# Patient Record
Sex: MALE | Race: WHITE | Employment: FULL TIME | URBAN - METROPOLITAN AREA
[De-identification: names, ages, dates, MRNs, and addresses within clinical notes are randomized per-mention and may not be internally consistent; named-entity substitution may affect disease eponyms.]

---

## 2020-10-26 ENCOUNTER — HOSPITAL ENCOUNTER (EMERGENCY)
Age: 35
Discharge: HOME | End: 2020-10-26
Attending: EMERGENCY MEDICINE
Payer: COMMERCIAL

## 2020-10-26 ENCOUNTER — HOSPITAL ENCOUNTER (EMERGENCY)
Age: 35
Discharge: HOME | End: 2020-10-26
Payer: COMMERCIAL

## 2020-10-26 ENCOUNTER — APPOINTMENT (OUTPATIENT)
Dept: CT IMAGING | Age: 35
End: 2020-10-26
Payer: COMMERCIAL

## 2020-10-26 VITALS
RESPIRATION RATE: 20 BRPM | HEART RATE: 86 BPM | OXYGEN SATURATION: 96 % | TEMPERATURE: 97.3 F | WEIGHT: 315 LBS | BODY MASS INDEX: 42.66 KG/M2 | HEIGHT: 72 IN | DIASTOLIC BLOOD PRESSURE: 71 MMHG | SYSTOLIC BLOOD PRESSURE: 130 MMHG

## 2020-10-26 VITALS
RESPIRATION RATE: 20 BRPM | OXYGEN SATURATION: 98 % | TEMPERATURE: 97 F | HEART RATE: 70 BPM | DIASTOLIC BLOOD PRESSURE: 80 MMHG | SYSTOLIC BLOOD PRESSURE: 148 MMHG

## 2020-10-26 PROCEDURE — 99212 OFFICE O/P EST SF 10 MIN: CPT

## 2020-10-26 PROCEDURE — 6360000002 HC RX W HCPCS

## 2020-10-26 PROCEDURE — 70450 CT HEAD/BRAIN W/O DYE: CPT

## 2020-10-26 PROCEDURE — 6370000000 HC RX 637 (ALT 250 FOR IP): Performed by: STUDENT IN AN ORGANIZED HEALTH CARE EDUCATION/TRAINING PROGRAM

## 2020-10-26 PROCEDURE — 99284 EMERGENCY DEPT VISIT MOD MDM: CPT

## 2020-10-26 RX ORDER — TETRAHYDROZOLINE HCL 0.05 %
1 DROPS OPHTHALMIC (EYE) ONCE
Status: COMPLETED | OUTPATIENT
Start: 2020-10-26 | End: 2020-10-26

## 2020-10-26 RX ORDER — KETOROLAC TROMETHAMINE 15 MG/ML
15 INJECTION, SOLUTION INTRAMUSCULAR; INTRAVENOUS ONCE
Status: DISCONTINUED | OUTPATIENT
Start: 2020-10-26 | End: 2020-10-26

## 2020-10-26 RX ORDER — KETOROLAC TROMETHAMINE 15 MG/ML
INJECTION, SOLUTION INTRAMUSCULAR; INTRAVENOUS
Status: DISCONTINUED
Start: 2020-10-26 | End: 2020-10-27 | Stop reason: HOSPADM

## 2020-10-26 RX ORDER — IBUPROFEN 400 MG/1
400 TABLET ORAL ONCE
Status: COMPLETED | OUTPATIENT
Start: 2020-10-26 | End: 2020-10-26

## 2020-10-26 RX ORDER — PREDNISONE 20 MG/1
60 TABLET ORAL DAILY
Qty: 15 TABLET | Refills: 0 | Status: SHIPPED | OUTPATIENT
Start: 2020-10-26 | End: 2020-10-31

## 2020-10-26 RX ORDER — VALACYCLOVIR HYDROCHLORIDE 1 G/1
1000 TABLET, FILM COATED ORAL 3 TIMES DAILY
Qty: 21 TABLET | Refills: 0 | Status: SHIPPED | OUTPATIENT
Start: 2020-10-26 | End: 2020-11-02

## 2020-10-26 RX ADMIN — TETRAHYDROZOLINE HCL 1 DROP: 0.05 SOLUTION/ DROPS OPHTHALMIC at 21:52

## 2020-10-26 RX ADMIN — IBUPROFEN 400 MG: 400 TABLET ORAL at 22:11

## 2020-10-26 ASSESSMENT — ENCOUNTER SYMPTOMS
VOMITING: 0
DIARRHEA: 0
COUGH: 0
EYE DISCHARGE: 0
SHORTNESS OF BREATH: 0
ABDOMINAL PAIN: 0
BACK PAIN: 0
WHEEZING: 0
EYE REDNESS: 0
SORE THROAT: 0
SINUS PRESSURE: 0
EYE PAIN: 1
NAUSEA: 0

## 2020-10-26 ASSESSMENT — PAIN SCALES - GENERAL: PAINLEVEL_OUTOF10: 6

## 2020-10-26 NOTE — ED PROVIDER NOTES
Department of Emergency Medicine  [facility]  Provider Note  Admit Date/Time: 10/26/2020  4:05 PM  Room: 02/02  MRN: [id number]  Chief Complaint: Other (started  yesterday right sided facial  droop    states went to dentist  was told it is bels palys   called dr  was told to come here  no weakness anywhere)       History of Present Illness   Source of history provided by:  patient. History/Exam Limitations: none. Sonia Monroy is a 38 y.o. male who has a past medical history of:   Past Medical History:   Diagnosis Date    Bursitis of shoulder     presents to the urgent care center by private car for evaluation. He developed a right-sided facial droop starting yesterday. He said it started with his mouth and he felt that that he could have had a dental problem so he went to the dentist this morning and his dental checkup was fine. He states that now the entire right side of his face is numb and he cannot close the right eye he cannot raise the right eyebrow he has a right-sided facial droop he has a sharp pain behind his right ear, and  this morning he said he had an occipital headache but that went away. He said he has some intermittent numbness in the right thigh but that has been going on for 2 weeks and may not be related. HE used the [facility] system and they told him it was Bell's palsy and that he should come to urgent care for evaluation. ROS    Pertinent positives and negatives are stated within HPI, all other systems reviewed and are negative. History reviewed. No pertinent surgical history. Social History:  reports that he has quit smoking. He has never used smokeless tobacco. He reports current alcohol use. He reports that he does not use drugs. Family History: family history includes Cancer in his father; Diabetes in his mother. Allergies: Cinnamon;  Other; and Pcn [penicillins]    Physical Exam   Oxygen Saturation Interpretation: Normal.   ED Triage Vitals [10/26/20 1607]   BP Temp Temp Source Pulse Resp SpO2 Height Weight   130/71 97.3 °F (36.3 °C) Infrared 86 20 96 % 6' (1.829 m) (!) 340 lb (154.2 kg)       Physical Exam  · Constitutional/General: Alert and oriented x3, well appearing, non toxic in NAD  · HEENT:  NC/NT. Unable to raise the right eyebrow, unable to close the right eye, he has a right-sided facial droop. · Neck: Supple, full ROM,  · Respiratory: Lungs clear to auscultation bilaterally, no wheezes, rales, or rhonchi. Not in respiratory distress  · CV:  Regular rate. Regular rhythm. No murmurs, gallops, or rubs. 2+ distal pulses  · Musculoskeletal: Moves all extremities x 4. Warm and well perfused, no clubbing, cyanosis, or edema. Capillary refill <3 seconds  · Integument: skin warm and dry. No rashes. · Lymphatic: no lymphadenopathy noted  · Neurologic: He has 5+ strength in the bilateral upper and lower extremities he has no drifting of the extremities, speech is normal.  ·   · Psychiatric: Normal Affect    Lab / Imaging Results   (All laboratory and radiology results have been personally reviewed by myself)  Labs:  No results found for this visit on 10/26/20. Imaging: All Radiology results interpreted by Radiologist unless otherwise noted. No orders to display       ED Course / Medical Decision Making   Medications - No data to display         MDM:   Patient with right-sided facial droop unable to close the right eye unable to raise her right eyebrow most likely Bell's palsy but he also has a headache this morning and the pain behind his right ear no other deficits noted. I did advise him to be evaluated in the ED for evaluation. Assessment      1. Facial droop      Plan   Discharge to home and advised to contact No follow-up provider specified.  Patient condition is good    New Medications     New Prescriptions    No medications on file     Electronically signed by PREETI Moon CNP   DD: 10/26/20  **This report was transcribed using voice recognition software. Every effort was made to ensure accuracy; however, inadvertent computerized transcription errors may be present.   END OF ED PROVIDER NOTE     Brook Min, APRN - CNP  10/26/20 8000

## 2020-10-27 NOTE — ED PROVIDER NOTES
Patient is a 50-year-old male present emerge department for 2 days of Bell's palsy-like symptoms. He states he is not been able to shut his eyes, and has had facial droop on the right side of his face. He also notes he is having a lot of pain at the base of his neck, and says he has had a history of prior chickenpox. He denies anything feeling weak, tingly, numb, he denies any recent illnesses, fevers, chills, nausea, vomiting. He rates the headache as a 5 out of 10, dull pain, he has not taken anything for today, nothing seems to make it better or worse. Onset was today. He believes the headache is related to his inability to close his eye as it feels like it is referred from that area. Review of Systems   Constitutional: Negative for chills and fever. HENT: Negative for ear pain, sinus pressure and sore throat. Eyes: Positive for pain. Negative for discharge and redness. Respiratory: Negative for cough, shortness of breath and wheezing. Cardiovascular: Negative for chest pain. Gastrointestinal: Negative for abdominal pain, diarrhea, nausea and vomiting. Genitourinary: Negative for dysuria and frequency. Musculoskeletal: Negative for arthralgias and back pain. Skin: Negative for rash. Allergic/Immunologic: Negative for immunocompromised state. Neurological: Positive for weakness (Facial weakness on the right side) and headaches. Hematological: Negative for adenopathy. All other systems reviewed and are negative. Physical Exam  Vitals signs and nursing note reviewed. Constitutional:       Appearance: He is well-developed. HENT:      Head: Normocephalic and atraumatic. Eyes:      General: No visual field deficit. Conjunctiva/sclera: Conjunctivae normal.      Pupils: Pupils are equal, round, and reactive to light. Neck:      Musculoskeletal: Normal range of motion and neck supple. Cardiovascular:      Rate and Rhythm: Normal rate and regular rhythm. Heart sounds: Normal heart sounds. No murmur. Pulmonary:      Effort: Pulmonary effort is normal. No respiratory distress. Breath sounds: Normal breath sounds. No wheezing or rales. Abdominal:      General: Bowel sounds are normal.      Palpations: Abdomen is soft. Tenderness: There is no abdominal tenderness. There is no guarding or rebound. Musculoskeletal:         General: No tenderness or deformity. Skin:     General: Skin is warm and dry. Neurological:      Mental Status: He is alert and oriented to person, place, and time. Cranial Nerves: Facial asymmetry present. No cranial nerve deficit or dysarthria. Sensory: No sensory deficit. Motor: No weakness, tremor, atrophy, abnormal muscle tone or pronator drift. Coordination: Coordination normal. Finger-Nose-Finger Test and Heel to Shin Test normal.      Comments: IH is 1 for his facial droop, however the facial droop includes his forehead, so this is likely not a central lesion. No abnormality noted on CT head. Procedures     [facility]     ED Course as of Oct 26 2209   Mon Oct 26, 2020   2131 Evaluated patient, his NIH score is a total of 1, given his facial droop, however he has good sensation on that side of his face, and no other signs of a possible stroke, CT head was negative. Patient likely having a Bell's palsy, as it is affecting both his forehead and the lower side of his face, he is also having a headache, so we will treat with Toradol. We will give him some eyedrops, and instructions on taping his eyes shut to help with the moisturizing of that eye. Return precautions given. [JG]   2132 Will give some Valtrex as well for the Bell's palsy, as well as some prednisone.     [JG]      ED Course User Index  [JG] Nai Piedra MD      Patient was having quite a bit of pain posterior to his ear, may be a preherpetic neuralgia, warned about the possibility of a rash breaking out, he also requested that we give him Motrin instead of Toradol, as his brother had an adverse reaction to it, given Motrin instead. --------------------------------------------- PAST HISTORY ---------------------------------------------  Past Medical History:  has a past medical history of Bursitis of shoulder. Past Surgical History:  has no past surgical history on file. Social History:  reports that he has quit smoking. He has never used smokeless tobacco. He reports current alcohol use. He reports that he does not use drugs. Family History: family history includes Cancer in his father; Diabetes in his mother. The patients home medications have been reviewed. Allergies: Cinnamon; Other; and Pcn [penicillins]    -------------------------------------------------- RESULTS -------------------------------------------------  Labs:  No results found for this visit on 10/26/20. Radiology:  CT HEAD WO CONTRAST   Final Result   No acute intracranial abnormality.             ------------------------- NURSING NOTES AND VITALS REVIEWED ---------------------------  Date / Time Roomed:  10/26/2020  8:51 PM  ED Bed Assignment:  16/16    The nursing notes within the ED encounter and vital signs as below have been reviewed. BP (!) 164/77   Pulse 87   Temp 97 °F (36.1 °C) (Infrared)   Resp 18   SpO2 95%   Oxygen Saturation Interpretation: Normal      ------------------------------------------ PROGRESS NOTES ------------------------------------------  10:11 PM EDT  I have spoken with the patient and discussed todays results, in addition to providing specific details for the plan of care and counseling regarding the diagnosis and prognosis. Their questions are answered at this time and they are agreeable with the plan. I discussed at length with them reasons for immediate return here for re evaluation. They will followup with their primary care physician by calling their office tomorrow.       --------------------------------- ADDITIONAL PROVIDER NOTES ---------------------------------  At this time the patient is without objective evidence of an acute process requiring hospitalization or inpatient management. They have remained hemodynamically stable throughout their entire ED visit and are stable for discharge with outpatient follow-up. The plan has been discussed in detail and they are aware of the specific conditions for emergent return, as well as the importance of follow-up. New Prescriptions    PREDNISONE (DELTASONE) 20 MG TABLET    Take 3 tablets by mouth daily for 5 days    VALACYCLOVIR (VALTREX) 1 G TABLET    Take 1 tablet by mouth 3 times daily for 7 days       Diagnosis:  1. Acute nonintractable headache, unspecified headache type    2. Bell's palsy        Disposition:  Patient's disposition: Discharge to home  Patient's condition is stable.           Tahir Cooney MD  Resident  10/26/20 6451

## 2020-11-04 ENCOUNTER — OFFICE VISIT (OUTPATIENT)
Dept: FAMILY MEDICINE CLINIC | Age: 35
End: 2020-11-04
Payer: COMMERCIAL

## 2020-11-04 VITALS
HEIGHT: 72 IN | HEART RATE: 82 BPM | OXYGEN SATURATION: 94 % | BODY MASS INDEX: 42.66 KG/M2 | DIASTOLIC BLOOD PRESSURE: 76 MMHG | WEIGHT: 315 LBS | SYSTOLIC BLOOD PRESSURE: 130 MMHG | TEMPERATURE: 97.2 F

## 2020-11-04 PROBLEM — G51.0 BELL'S PALSY: Status: ACTIVE | Noted: 2020-11-04

## 2020-11-04 PROBLEM — Z99.89 OSA ON CPAP: Status: ACTIVE | Noted: 2020-11-04

## 2020-11-04 PROBLEM — G47.33 OSA ON CPAP: Status: ACTIVE | Noted: 2020-11-04

## 2020-11-04 PROCEDURE — 99203 OFFICE O/P NEW LOW 30 MIN: CPT | Performed by: FAMILY MEDICINE

## 2020-11-04 PROCEDURE — 6360000002 HC RX W HCPCS

## 2020-11-04 PROCEDURE — 1036F TOBACCO NON-USER: CPT | Performed by: FAMILY MEDICINE

## 2020-11-04 PROCEDURE — G8482 FLU IMMUNIZE ORDER/ADMIN: HCPCS | Performed by: FAMILY MEDICINE

## 2020-11-04 PROCEDURE — G0008 ADMIN INFLUENZA VIRUS VAC: HCPCS

## 2020-11-04 PROCEDURE — G8427 DOCREV CUR MEDS BY ELIG CLIN: HCPCS | Performed by: FAMILY MEDICINE

## 2020-11-04 PROCEDURE — G8417 CALC BMI ABV UP PARAM F/U: HCPCS | Performed by: FAMILY MEDICINE

## 2020-11-04 PROCEDURE — 90686 IIV4 VACC NO PRSV 0.5 ML IM: CPT

## 2020-11-04 RX ORDER — PREDNISONE 10 MG/1
TABLET ORAL
Qty: 23 TABLET | Refills: 0 | Status: SHIPPED
Start: 2020-11-04 | End: 2020-11-18

## 2020-11-04 RX ORDER — GABAPENTIN 300 MG/1
300 CAPSULE ORAL 3 TIMES DAILY
Qty: 270 CAPSULE | Refills: 1 | Status: CANCELLED | OUTPATIENT
Start: 2020-11-04 | End: 2021-05-03

## 2020-11-04 RX ORDER — GABAPENTIN 300 MG/1
300 CAPSULE ORAL 3 TIMES DAILY
Qty: 90 CAPSULE | Refills: 0 | Status: SHIPPED | OUTPATIENT
Start: 2020-11-04 | End: 2020-12-04

## 2020-11-04 RX ORDER — PREDNISONE 20 MG/1
40 TABLET ORAL DAILY
Qty: 20 TABLET | Refills: 0 | Status: CANCELLED | OUTPATIENT
Start: 2020-11-04 | End: 2020-11-14

## 2020-11-04 RX ORDER — M-VIT,TX,IRON,MINS/CALC/FOLIC 27MG-0.4MG
1 TABLET ORAL DAILY
COMMUNITY

## 2020-11-04 SDOH — HEALTH STABILITY: MENTAL HEALTH: HOW MANY STANDARD DRINKS CONTAINING ALCOHOL DO YOU HAVE ON A TYPICAL DAY?: 1 OR 2

## 2020-11-04 ASSESSMENT — ENCOUNTER SYMPTOMS
SORE THROAT: 0
ABDOMINAL PAIN: 0
COUGH: 0
CONSTIPATION: 0
DIARRHEA: 0
SHORTNESS OF BREATH: 0
EYE REDNESS: 0
VOMITING: 0
NAUSEA: 0
BLOOD IN STOOL: 0
EYE PAIN: 0

## 2020-11-04 ASSESSMENT — PATIENT HEALTH QUESTIONNAIRE - PHQ9
SUM OF ALL RESPONSES TO PHQ QUESTIONS 1-9: 0
SUM OF ALL RESPONSES TO PHQ9 QUESTIONS 1 & 2: 0
2. FEELING DOWN, DEPRESSED OR HOPELESS: 0
1. LITTLE INTEREST OR PLEASURE IN DOING THINGS: 0

## 2020-11-04 NOTE — PROGRESS NOTES
[facility]  FAMILY MEDICINE RESIDENCY PROGRAM   OFFICE PROGRESS NOTE  DATEOF VISIT : 20    Patient : Tio Porter   Sex : male   Age : 38 y.o.  : 1985           Chief Complaint :   Chief Complaint   Patient presents with    New Patient    Facial Pain     Right side    Jaw Pain     Right side    Other     Facial drooping right side       HPI:   38 y.o. male comes in today for follow up from recent ER visit for right sided facial droop 10/26 diagnosed with Bell's Palsy and as a new patient to establish care. Wife gave birth to their second child on 10/19 and the fatigue/facial weakness started 2 days later. He woke up on 10/25 with mouth drooping. He went to the dentist who said the problem was not dental.  He went to the ER where they gave him valtrex and prednisone 60 mg for 5 days which he completed. He was feeling better with no pain but he lifted a child onto his shoulders for Halloween and pain has started. He has been having pain on his face in the area of his jaw, beard, and behind his ear on the scalp. He has not seen any rash or blisters. He has tried a heating pad, pain /10. He has eye lubricant drops and trying to tape eye shut. His 25month-old makes it difficult to keep it there. Would like information about obtaining a vasectomy. Patient Active Problem List   Diagnosis    Bell's palsy       Past Medical History:   Diagnosis Date    Bursitis of shoulder     right       No current outpatient medications on file prior to visit. No current facility-administered medications on file prior to visit. Allergies   Allergen Reactions    Cinnamon      scent    Other      Long haired pet dander, Pine needles    Pcn [Penicillins] Other (See Comments)     Had as child       Family History   Problem Relation Age of Onset    Diabetes Mother     Cancer Father 48        prostate       History reviewed. No pertinent surgical history.     Social History     Tobacco Use    Smoking status: Former Smoker     Packs/day: 0.50     Years: 17.00     Pack years: 8.50     Types: Cigarettes     Start date: 1999     Last attempt to quit: 2017     Years since quitting: 3.8    Smokeless tobacco: Never Used   Substance Use Topics    Alcohol use: Yes     Alcohol/week: 1.0 standard drinks     Types: 1 Cans of beer per week     Drinks per session: 1 or 2     Comment: socially every 2 weeks    Drug use: No       Review of Systems  Review of Systems   Constitutional: Negative for chills and fever. HENT: Negative for congestion, ear pain and sore throat. Eyes: Negative for pain and redness. Respiratory: Negative for cough and shortness of breath. Cardiovascular: Negative for chest pain, palpitations and leg swelling. Gastrointestinal: Negative for abdominal pain, blood in stool, constipation, diarrhea, nausea and vomiting. Genitourinary: Negative for dysuria, frequency and hematuria. Musculoskeletal: Positive for neck pain. Negative for myalgias. Skin: Negative for rash. Allergic/Immunologic: Negative for environmental allergies. Neurological: Positive for numbness and headaches. Negative for dizziness, tremors, speech difficulty, weakness and light-headedness. Hematological: Does not bruise/bleed easily. Psychiatric/Behavioral: The patient is not nervous/anxious. Physical Exam:  VS:  Blood pressure 130/76, pulse 82, temperature 97.2 °F (36.2 °C), temperature source Temporal, height 6' (1.829 m), weight (!) 342 lb (155.1 kg), SpO2 94 %. Physical Exam  Constitutional:       Appearance: He is well-developed. HENT:      Head: Normocephalic and atraumatic. Cardiovascular:      Rate and Rhythm: Normal rate and regular rhythm. Heart sounds: No murmur. No friction rub. No gallop. Pulmonary:      Breath sounds: Normal breath sounds. No wheezing, rhonchi or rales.    Abdominal:      General: Bowel sounds are normal.      Palpations: Abdomen is soft. There is no mass. Tenderness: There is no abdominal tenderness. Skin:     Nails: There is no clubbing. Skin: no rashes  Neuro: CN 2-12 intact except ttp over the jaw and posterior ear muscles, nl sensation, loss of forehead wrinkles, difficulty with closing the right eye but PERRL, EOMI, rt lower facial droop, tongue midline; ms 5/5 upper and lower extremities and normal sensation and dtrs 2+ symmetric    Assessment/Plan:  1. Bell's palsy  He also plans to see his optometrist.  - predniSONE (DELTASONE) 10 MG tablet; Take 6 pills po day 1, then 5 pills, then 4, then 3, then 2, then 1, 1/2 pill daily x 2 days and finish  Dispense: 23 tablet; Refill: 0  - gabapentin (NEURONTIN) 300 MG capsule; Take 1 capsule by mouth 3 times daily for 30 days. Intended supply: 90 days  Dispense: 90 capsule; Refill: 0    2. Flu vaccine need  - INFLUENZA, QUADV, 3 YRS AND OLDER, IM PF, PREFILL SYR OR SDV, 0.5ML (AFLURIA QUADV, PF)    3. Vasectomy evaluation  - External Referral To Urology    He was given an eye patch and will make an appointment with is eye doctor. Additional plan and future considerations:   fbg screen for dm next time    Return in about 2 weeks (around 11/18/2020) for 1:20 pm follow up Bell's palsy.     Signed by : Javi Lay MD

## 2020-11-18 ENCOUNTER — OFFICE VISIT (OUTPATIENT)
Dept: FAMILY MEDICINE CLINIC | Age: 35
End: 2020-11-18
Payer: COMMERCIAL

## 2020-11-18 VITALS
WEIGHT: 315 LBS | HEART RATE: 82 BPM | TEMPERATURE: 96.7 F | HEIGHT: 72 IN | BODY MASS INDEX: 42.66 KG/M2 | SYSTOLIC BLOOD PRESSURE: 132 MMHG | DIASTOLIC BLOOD PRESSURE: 72 MMHG | OXYGEN SATURATION: 93 %

## 2020-11-18 PROCEDURE — 99212 OFFICE O/P EST SF 10 MIN: CPT | Performed by: FAMILY MEDICINE

## 2020-11-18 PROCEDURE — G8482 FLU IMMUNIZE ORDER/ADMIN: HCPCS | Performed by: FAMILY MEDICINE

## 2020-11-18 PROCEDURE — G8427 DOCREV CUR MEDS BY ELIG CLIN: HCPCS | Performed by: FAMILY MEDICINE

## 2020-11-18 PROCEDURE — G8417 CALC BMI ABV UP PARAM F/U: HCPCS | Performed by: FAMILY MEDICINE

## 2020-11-18 PROCEDURE — 1036F TOBACCO NON-USER: CPT | Performed by: FAMILY MEDICINE

## 2020-11-18 PROCEDURE — 99213 OFFICE O/P EST LOW 20 MIN: CPT | Performed by: FAMILY MEDICINE

## 2020-11-18 ASSESSMENT — ENCOUNTER SYMPTOMS
ABDOMINAL PAIN: 0
NAUSEA: 0
SHORTNESS OF BREATH: 0
VOMITING: 0
COUGH: 0

## 2020-11-18 NOTE — PATIENT INSTRUCTIONS
Patient Education        Bell's Palsy: Care Instructions  Your Care Instructions     Bell's palsy is paralysis or weakness of the muscles on one side of the face. Often people with Bell's palsy have a droop on one side of the mouth and have trouble completely shutting the eye on the same side. Bell's palsy can also interfere with the sense of taste. These things happen when a nerve in your face becomes inflamed. Bell's palsy is not caused by a stroke. The cause of the nerve inflammation is not known. But some experts think that a virus may cause it. Because of this, doctors sometimes prescribe antiviral medicine to treat it. You also may get medicine to reduce swelling. Bell's palsy usually gets better on its own in a few weeks or months. Follow-up care is a key part of your treatment and safety. Be sure to make and go to all appointments, and call your doctor if you are having problems. It's also a good idea to know your test results and keep a list of the medicines you take. How can you care for yourself at home? · Take your medicines exactly as prescribed. Call your doctor if you think you are having a problem with your medicine. You will get more details on the specific medicines your doctor prescribes. · Use artificial tears or ointment if your eyes are too dry. Bell's palsy can make your lower eyelid droop, causing a dry eye. · If you cannot completely close your eye, consider using an eye patch while you sleep. · Help yourself blink by using your finger to close and open your eyelid. This may help keep your eye moist.  · Wear glasses or goggles to keep dust and dirt out of your eye. · As feeling comes back to your face, massage your forehead, cheeks, and lips. Massage may make the muscles in your face stronger. · Brush and floss your teeth often to help prevent tooth decay. Bell's palsy can dry up the spit on one side of your mouth. This increases the risk of tooth decay.   When should you call for help?   Call 911 anytime you think you may need emergency care. For example, call if:    · You have symptoms of a stroke. These may include:  ? Sudden numbness, tingling, weakness, or loss of movement in your face, arm, or leg, especially on only one side of your body. ? Sudden vision changes. ? Sudden trouble speaking. ? Sudden confusion or trouble understanding simple statements. ? Sudden problems with walking or balance. ? A sudden, severe headache that is different from past headaches. Call your doctor now or seek immediate medical care if:    · You have numbness or weakness that spreads beyond one side of your face.     · You have a skin rash or eye pain or redness, or light bothers your eyes.     · You have a new or worse headache. Watch closely for changes in your health, and be sure to contact your doctor if:    · You do not get better as expected. Where can you learn more? Go to [email or web address] and sign in to your [employer] account. Enter P168 in the [employer] box to learn more about \"Bell's Palsy: Care Instructions. \"     If you do not have an account, please click on the \"Sign Up Now\" link. Current as of: November 20, 2019               Content Version: 12.6  © 2885-8730 [employer]. Care instructions adapted under license by [facility] ([facility]). If you have questions about a medical condition or this instruction, always ask your healthcare professional. [place] [address] any warranty or liability for your use of this information.

## 2020-11-18 NOTE — PROGRESS NOTES
[address] [facility]   FAMILY MEDICINE RESIDENCY PROGRAM   OFFICE PROGRESS NOTE  DATEOF VISIT : 20    Patient : Allegra Roldan   Sex : male   Age : 38 y.o.  : 1985           Chief Complaint :   Chief Complaint   Patient presents with    Check-Up       HPI:   38 y.o. male comes in today for Bell's palsy on the right. Thinks his symptoms are improving. Headaches are nearly resolved. Some blurry vision. Occasionally rt ear pain. No numbness. Eye is able to close better on the right. Finished the steroids, tapering down on gabapentin use to daily or only prn. Sometimes using a patch over the right eye and often uses the lubricating eye drops. Admits he was told his eye pressure was borderline high in the past.     Review of Systems  Review of Systems   Constitutional: Negative for chills and fever. HENT: Negative for congestion. Respiratory: Negative for cough and shortness of breath. Cardiovascular: Negative for chest pain, palpitations and leg swelling. Gastrointestinal: Negative for abdominal pain, nausea and vomiting. as above    Physical Exam:  VS:  Blood pressure 132/72, pulse 82, temperature 96.7 °F (35.9 °C), temperature source Temporal, height 6' (1.829 m), weight (!) 341 lb (154.7 kg), SpO2 93 %. Physical Exam  Constitutional:       Appearance: He is well-developed. HENT:      Head: Normocephalic and atraumatic. Cardiovascular:      Rate and Rhythm: Normal rate and regular rhythm. Heart sounds: No murmur. No friction rub. No gallop. Pulmonary:      Breath sounds: Normal breath sounds. No wheezing, rhonchi or rales. Abdominal:      General: Bowel sounds are normal.      Palpations: Abdomen is soft. There is no mass. Tenderness: There is no abdominal tenderness. Skin:     Nails: There is no clubbing.        Neuro: CN 2-12 intact except loss of forehead wrinkles, difficulty with closing the right eye but PERRL, rt lower facial droop, tongue mildly to the left; ms 5/5 upper  Rt eye less closed, blurry vision on the right  With testing of EOMI, rt eye appears to be higher    Assessment/Plan:  1. Blurry vision, right eye  Advised that he see his eye doctor as just got glasses before this happened and now he is having new rt eye blurriness. Will r/o stroke. - MRI BRAIN W WO CONTRAST; Future    2. Bell's palsy  Clinically improving but due to eye symptoms will obtain the MRI. Additional plan and future considerations:  Check FBG in the future    Return in about 2 months (around 1/18/2021) for Bell's Palsy.     Signed by : Jose Ferguson MD

## 2020-11-30 ENCOUNTER — HOSPITAL ENCOUNTER (OUTPATIENT)
Dept: MRI IMAGING | Age: 35
Discharge: HOME | End: 2020-12-02
Payer: COMMERCIAL

## 2020-11-30 PROCEDURE — 6360000004 HC RX CONTRAST MEDICATION: Performed by: RADIOLOGY

## 2020-11-30 PROCEDURE — A9579 GAD-BASE MR CONTRAST NOS,1ML: HCPCS | Performed by: RADIOLOGY

## 2020-11-30 PROCEDURE — 70553 MRI BRAIN STEM W/O & W/DYE: CPT

## 2020-11-30 RX ADMIN — GADOTERIDOL 20 ML: 279.3 INJECTION, SOLUTION INTRAVENOUS at 09:52

## 2021-03-26 ENCOUNTER — IMMUNIZATION (OUTPATIENT)
Dept: PRIMARY CARE CLINIC | Age: 36
End: 2021-03-26
Payer: COMMERCIAL

## 2021-03-26 PROCEDURE — 0011A COVID-19, MODERNA VACCINE 100MCG/0.5ML DOSE: CPT | Performed by: NURSE PRACTITIONER

## 2021-03-26 PROCEDURE — 91301 COVID-19, MODERNA VACCINE 100MCG/0.5ML DOSE: CPT | Performed by: NURSE PRACTITIONER

## 2021-04-23 ENCOUNTER — IMMUNIZATION (OUTPATIENT)
Dept: PRIMARY CARE CLINIC | Age: 36
End: 2021-04-23
Payer: COMMERCIAL

## 2021-04-23 PROCEDURE — 91301 COVID-19, MODERNA VACCINE 100MCG/0.5ML DOSE: CPT | Performed by: NURSE PRACTITIONER

## 2021-04-23 PROCEDURE — 0012A COVID-19, MODERNA VACCINE 100MCG/0.5ML DOSE: CPT | Performed by: NURSE PRACTITIONER

## 2021-11-10 ENCOUNTER — TELEPHONE (OUTPATIENT)
Dept: FAMILY MEDICINE CLINIC | Age: 36
End: 2021-11-10

## 2023-08-21 ENCOUNTER — TELEPHONE (OUTPATIENT)
Dept: FAMILY MEDICINE CLINIC | Age: 38
End: 2023-08-21

## 2023-08-21 NOTE — TELEPHONE ENCOUNTER
Called pt and pt said a year ago he was scheduled with Dr Esther Stanley but there are no appts in [address] [place] [address] since 2021. Advised pt Dr Esther Stanley isn't taking new patients right now and pt said he'll just call back and schedule with his current PCP.

## 2023-08-21 NOTE — TELEPHONE ENCOUNTER
----- Message from Patrick Melendez sent at 8/21/2023 12:11 PM EDT -----  Subject: Appointment Request    Reason for Call: New Patient/New to Provider Appointment needed: New   Patient Request Appointment    QUESTIONS    Reason for appointment request? Requested Provider unavailable Nirmala Morgan     Additional Information for Provider? Patient had establish care on 8/21   (appt was made last year but appt was lost) and would like to be seen as   soon as possible since appt was made a year in advance.  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  ---------------------------------------------------------------------------  --------------  Taye DAN  [phone]; OK to leave message on voicemail  ---------------------------------------------------------------------------  --------------  SCRIPT ANSWERS

## 2024-03-18 ENCOUNTER — TELEPHONE (OUTPATIENT)
Dept: FAMILY MEDICINE CLINIC | Age: 39
End: 2024-03-18

## 2024-03-18 NOTE — TELEPHONE ENCOUNTER
Pt called to request new patient appt with Dr. Melendez, mother and brother are patients. Mother reports Dr. Melendez told her that he would be willing to see pt.

## 2024-09-20 ENCOUNTER — OFFICE VISIT (OUTPATIENT)
Dept: FAMILY MEDICINE CLINIC | Age: 39
End: 2024-09-20

## 2024-09-20 VITALS
DIASTOLIC BLOOD PRESSURE: 74 MMHG | OXYGEN SATURATION: 95 % | WEIGHT: 315 LBS | HEART RATE: 87 BPM | TEMPERATURE: 97.2 F | RESPIRATION RATE: 16 BRPM | HEIGHT: 71 IN | SYSTOLIC BLOOD PRESSURE: 118 MMHG | BODY MASS INDEX: 44.1 KG/M2

## 2024-09-20 DIAGNOSIS — Z23 NEED FOR INFLUENZA VACCINATION: ICD-10-CM

## 2024-09-20 DIAGNOSIS — Z11.4 SCREENING FOR HIV (HUMAN IMMUNODEFICIENCY VIRUS): ICD-10-CM

## 2024-09-20 DIAGNOSIS — Z11.59 NEED FOR HEPATITIS C SCREENING TEST: ICD-10-CM

## 2024-09-20 DIAGNOSIS — E66.01 OBESITY, MORBID, BMI 50 OR HIGHER (HCC): ICD-10-CM

## 2024-09-20 DIAGNOSIS — G47.33 OSA ON CPAP: ICD-10-CM

## 2024-09-20 DIAGNOSIS — Z80.0 FAMILY HISTORY OF COLON CANCER IN FATHER: Primary | ICD-10-CM

## 2024-09-20 DIAGNOSIS — Z23 NEED FOR TDAP VACCINATION: ICD-10-CM

## 2024-09-20 SDOH — ECONOMIC STABILITY: FOOD INSECURITY: WITHIN THE PAST 12 MONTHS, THE FOOD YOU BOUGHT JUST DIDN'T LAST AND YOU DIDN'T HAVE MONEY TO GET MORE.: NEVER TRUE

## 2024-09-20 SDOH — ECONOMIC STABILITY: INCOME INSECURITY: HOW HARD IS IT FOR YOU TO PAY FOR THE VERY BASICS LIKE FOOD, HOUSING, MEDICAL CARE, AND HEATING?: NOT HARD AT ALL

## 2024-09-20 SDOH — ECONOMIC STABILITY: FOOD INSECURITY: WITHIN THE PAST 12 MONTHS, YOU WORRIED THAT YOUR FOOD WOULD RUN OUT BEFORE YOU GOT MONEY TO BUY MORE.: NEVER TRUE

## 2024-09-20 ASSESSMENT — PATIENT HEALTH QUESTIONNAIRE - PHQ9
2. FEELING DOWN, DEPRESSED OR HOPELESS: NOT AT ALL
SUM OF ALL RESPONSES TO PHQ QUESTIONS 1-9: 0
1. LITTLE INTEREST OR PLEASURE IN DOING THINGS: NOT AT ALL
SUM OF ALL RESPONSES TO PHQ QUESTIONS 1-9: 0
SUM OF ALL RESPONSES TO PHQ9 QUESTIONS 1 & 2: 0

## 2024-09-20 ASSESSMENT — ENCOUNTER SYMPTOMS
ABDOMINAL PAIN: 0
SHORTNESS OF BREATH: 0
BLOOD IN STOOL: 0
COUGH: 0
BACK PAIN: 0

## 2024-11-19 ENCOUNTER — TELEPHONE (OUTPATIENT)
Dept: FAMILY MEDICINE CLINIC | Age: 39
End: 2024-11-19

## 2024-11-19 DIAGNOSIS — G47.33 OSA ON CPAP: Primary | ICD-10-CM

## 2024-11-19 NOTE — TELEPHONE ENCOUNTER
Called and spoke to pt's mother who said pt's CPAP isn't working and he'd like a referral to a new sleep doctor.  He's requesting referral to [facility].    ----- Message from Raul CLEMENTS sent at 11/19/2024 11:36 AM EST -----  Regarding: [facility] Referral Request  [facility] Referral Request    Reason for referral request: Specialty Provider    Specialist/Lab/Test patient is requesting (if known): Sleep study    Specialist Phone Number (if applicable): NA    Additional Information : Caller is wanting to get a referral for a sleep study  --------------------------------------------------------------------------------------------------------------------------    Relationship to Patient: Guanako Grajeda - Mother    Call Back Information: OK to leave message on voicemail  Preferred Call Back Number: Phone  [phone]